# Patient Record
Sex: FEMALE | Race: BLACK OR AFRICAN AMERICAN | Employment: UNEMPLOYED | URBAN - METROPOLITAN AREA
[De-identification: names, ages, dates, MRNs, and addresses within clinical notes are randomized per-mention and may not be internally consistent; named-entity substitution may affect disease eponyms.]

---

## 2022-02-09 ENCOUNTER — HOSPITAL ENCOUNTER (EMERGENCY)
Age: 22
Discharge: HOME | End: 2022-02-09
Attending: EMERGENCY MEDICINE
Payer: COMMERCIAL

## 2022-02-09 ENCOUNTER — APPOINTMENT (OUTPATIENT)
Dept: GENERAL RADIOLOGY | Age: 22
End: 2022-02-09
Attending: STUDENT IN AN ORGANIZED HEALTH CARE EDUCATION/TRAINING PROGRAM
Payer: COMMERCIAL

## 2022-02-09 VITALS
WEIGHT: 293 LBS | TEMPERATURE: 98 F | DIASTOLIC BLOOD PRESSURE: 106 MMHG | RESPIRATION RATE: 20 BRPM | OXYGEN SATURATION: 100 % | BODY MASS INDEX: 43.4 KG/M2 | HEART RATE: 110 BPM | HEIGHT: 69 IN | SYSTOLIC BLOOD PRESSURE: 148 MMHG

## 2022-02-09 DIAGNOSIS — S52.502A CLOSED FRACTURE OF DISTAL END OF LEFT RADIUS, UNSPECIFIED FRACTURE MORPHOLOGY, INITIAL ENCOUNTER: Primary | ICD-10-CM

## 2022-02-09 PROCEDURE — 99283 EMERGENCY DEPT VISIT LOW MDM: CPT

## 2022-02-09 PROCEDURE — 73110 X-RAY EXAM OF WRIST: CPT

## 2022-02-09 PROCEDURE — 74011250637 HC RX REV CODE- 250/637: Performed by: EMERGENCY MEDICINE

## 2022-02-09 RX ORDER — IBUPROFEN 400 MG/1
800 TABLET ORAL ONCE
Status: COMPLETED | OUTPATIENT
Start: 2022-02-09 | End: 2022-02-09

## 2022-02-09 RX ORDER — OXYCODONE AND ACETAMINOPHEN 5; 325 MG/1; MG/1
1 TABLET ORAL
Qty: 6 TABLET | Refills: 0 | Status: SHIPPED | OUTPATIENT
Start: 2022-02-09 | End: 2022-02-12

## 2022-02-09 RX ADMIN — IBUPROFEN 800 MG: 400 TABLET, FILM COATED ORAL at 08:15

## 2022-02-09 NOTE — ED PROVIDER NOTES
EMERGENCY DEPARTMENT HISTORY AND PHYSICAL EXAM    7:11 AM patient seen at this time and results waiting area      Date: 2/9/2022  Patient Name: Ramonita Lee    History of Presenting Illness     Chief Complaint   Patient presents with    Wrist Pain         History Provided By: patient    Additional History (Context): Ramonita Lee is a 19 y.o. female presents with suffered a mechanical fall last night around 10 PM, then increasing left wrist pain. Rates pain as severe no other injuries no syncope pain is constant. No relevant medical history. Pinky Angles PCP: None    Chief Complaint:   Duration:    Timing:    Location:   Quality:   Severity:   Modifying Factors:   Associated Symptoms:           Past History     Past Medical History:  No past medical history on file. Past Surgical History:  No past surgical history on file. Family History:  No family history on file. Social History:  Social History     Tobacco Use    Smoking status: Not on file    Smokeless tobacco: Not on file   Substance Use Topics    Alcohol use: Not on file    Drug use: Not on file       Allergies:  No Known Allergies      Review of Systems     Review of Systems   Constitutional: Negative for diaphoresis and fever. HENT: Negative for congestion and sore throat. Eyes: Negative for pain and itching. Respiratory: Negative for cough and shortness of breath. Cardiovascular: Negative for chest pain and palpitations. Gastrointestinal: Negative for abdominal pain and diarrhea. Endocrine: Negative for polydipsia and polyuria. Genitourinary: Negative for dysuria and hematuria. Musculoskeletal: Positive for arthralgias. Negative for myalgias. Skin: Negative for rash and wound. Neurological: Negative for seizures and syncope. Hematological: Does not bruise/bleed easily. Psychiatric/Behavioral: Negative for agitation and hallucinations.          Physical Exam       Patient Vitals for the past 12 hrs:   Temp Pulse Resp BP SpO2   02/09/22 0529 98 °F (36.7 °C) (!) 110 20 (!) 148/106 98 %       IPVITALS  Patient Vitals for the past 24 hrs:   BP Temp Pulse Resp SpO2 Height Weight   02/09/22 0529 (!) 148/106 98 °F (36.7 °C) (!) 110 20 98 % 5' 9\" (1.753 m) 158.8 kg (350 lb)       Physical Exam  Vitals and nursing note reviewed. Constitutional:       Appearance: She is well-developed. HENT:      Head: Normocephalic and atraumatic. Eyes:      General: No scleral icterus. Conjunctiva/sclera: Conjunctivae normal.   Neck:      Vascular: No JVD. Cardiovascular:      Rate and Rhythm: Normal rate and regular rhythm. Pulmonary:      Effort: Pulmonary effort is normal. No respiratory distress. Musculoskeletal:         General: Swelling, tenderness (A right distal radius. Neurovascular intact no skin defect.) and deformity present. Cervical back: Normal range of motion and neck supple. Skin:     General: Skin is warm and dry. Neurological:      Mental Status: She is alert. Psychiatric:         Thought Content: Thought content normal.         Judgment: Judgment normal.           Diagnostic Study Results   Labs -  No results found for this or any previous visit (from the past 24 hour(s)). Radiologic Studies -   XR WRIST LT AP/LAT/OBL MIN 3V   Final Result   1. Fracture of the dorsal lip of the distal radius. XR WRIST LT AP/LAT/OBL MIN 3V    Result Date: 2/9/2022  EXAM: Left wrist x-ray INDICATION: fall;wrist pain TECHNIQUE: 3 views of the left wrist obtained. COMPARISON:  None. FINDINGS: There is a fracture of the posterior lip of the distal radius which involves a portion of the articular surface. No dislocation identified. There is no evidence of erosions or periostitis. No lytic or blastic focus appreciated. Diffuse soft tissue edema is noted. 1.  Fracture of the dorsal lip of the distal radius.        Medications ordered:   Medications   ibuprofen (MOTRIN) tablet 800 mg (has no administration in time range)         Medical Decision Making   Initial Medical Decision Making and DDx:  I reviewed films of the left wrist.  Consistent with distal radius fracture, impaction. Not suggestive of tendon injury considering the physical exam.  Will place splint and refer to hand surgery. ED Course: Progress Notes, Reevaluation, and Consults:     Splint assessment after staff is placed in a splint, neurovascularly intact and patient is comfortable. I am the first provider for this patient. I reviewed the vital signs, available nursing notes, past medical history, past surgical history, family history and social history. Patient Vitals for the past 12 hrs:   Temp Pulse Resp BP SpO2   02/09/22 0529 98 °F (36.7 °C) (!) 110 20 (!) 148/106 98 %       Vital Signs-Reviewed the patient's vital signs. Pulse Oximetry Analysis, Cardiac Monitor, 12 lead ekg:      Interpreted by the EP. Records Reviewed: Nursing notes reviewed (Time of Review: 7:11 AM)    Procedures:   Critical Care Time:   Aspirin: (was aspirin given for stroke?)    Diagnosis     Clinical Impression:   1. Closed fracture of distal end of left radius, unspecified fracture morphology, initial encounter        Disposition: Discharged      Follow-up Information     Follow up With Specialties Details Why Contact Leslie Sánchez, DO Hand Surgery Call today  [facility]  [phone]             Patient's Medications   Start Taking    OXYCODONE-ACETAMINOPHEN (PERCOCET) 5-325 MG PER TABLET    Take 1 Tablet by mouth every four (4) hours as needed for Pain for up to 3 days. Max Daily Amount: 6 Tablets.    Continue Taking    No medications on file   These Medications have changed    No medications on file   Stop Taking    No medications on file     _______________________________    Notes:    Janes Deleon MD using Dragon dictation      _______________________________

## 2022-02-09 NOTE — Clinical Note
[address] [place] [address] Dr AYANA HIGGINS [facility] EMERGENCY DEPT  [id number] [address] [phone]    Work/School Note    Date: 2/9/2022    To Whom It May concern:    Maria Victoria Mittal was seen and treated today in the emergency room by the following provider(s):  Attending Provider: Morteza Hinton MD.      Maria Victoria Mittal is excused from work/school on 02/09/22 and 02/10/22. She is medically clear to return to work/school on 2/11/2022.        Sincerely,          Olaf Flores MD

## 2022-02-10 ENCOUNTER — OFFICE VISIT (OUTPATIENT)
Dept: ORTHOPEDIC SURGERY | Age: 22
End: 2022-02-10
Payer: COMMERCIAL

## 2022-02-10 VITALS
HEART RATE: 95 BPM | HEIGHT: 69 IN | BODY MASS INDEX: 43.4 KG/M2 | OXYGEN SATURATION: 98 % | TEMPERATURE: 97.3 F | WEIGHT: 293 LBS

## 2022-02-10 DIAGNOSIS — S52.552A OTHER CLOSED EXTRA-ARTICULAR FRACTURE OF DISTAL END OF LEFT RADIUS, INITIAL ENCOUNTER: Primary | ICD-10-CM

## 2022-02-10 PROCEDURE — 99203 OFFICE O/P NEW LOW 30 MIN: CPT | Performed by: ORTHOPAEDIC SURGERY

## 2022-02-10 RX ORDER — DICLOFENAC SODIUM 75 MG/1
75 TABLET, DELAYED RELEASE ORAL 2 TIMES DAILY WITH MEALS
Qty: 20 TABLET | Refills: 0 | Status: SHIPPED | OUTPATIENT
Start: 2022-02-10 | End: 2022-02-20

## 2022-02-10 NOTE — PROGRESS NOTES
Jovani Baxter is a 19 y.o. female right handed unknown employment. Worker's Compensation and legal considerations: none filed. Vitals:    02/10/22 1529   Pulse: 95   Temp: 97.3 °F (36.3 °C)   TempSrc: Temporal   SpO2: 98%   Weight: (!) 361 lb 12.8 oz (164.1 kg)   Height: 5' 9\" (1.753 m)   PainSc:  10 - Worst pain ever   PainLoc: Wrist           Chief Complaint   Patient presents with    Wrist Pain     LT Wrist FX-DOI-2/8/22         HPI: Patient presents today with complaints of left wrist pain after an injury in a fall. Date of onset: 2/8/2022    Injury: Yes: Comment: Fall    Prior Treatment:  Yes: Comment: Splint    Numbness/ Tingling: No      ROS: Review of Systems - General ROS: negative  Psychological ROS: negative  ENT ROS: negative  Allergy and Immunology ROS: negative  Hematological and Lymphatic ROS: negative  Respiratory ROS: no cough, shortness of breath, or wheezing  Cardiovascular ROS: no chest pain or dyspnea on exertion  Gastrointestinal ROS: no abdominal pain, change in bowel habits, or black or bloody stools  Musculoskeletal ROS: negative  Neurological ROS: negative  Dermatological ROS: negative    History reviewed. No pertinent past medical history. Past Surgical History:   Procedure Laterality Date    HX HERNIA REPAIR  2000       Current Outpatient Medications   Medication Sig Dispense Refill    diclofenac EC (VOLTAREN) 75 mg EC tablet Take 1 Tablet by mouth two (2) times daily (with meals) for 10 days. 20 Tablet 0    oxyCODONE-acetaminophen (Percocet) 5-325 mg per tablet Take 1 Tablet by mouth every four (4) hours as needed for Pain for up to 3 days. Max Daily Amount: 6 Tablets. 6 Tablet 0       Allergies   Allergen Reactions    Amoxicillin Nausea and Vomiting    Pcn [Penicillins] Swelling     Gums swell per pt           PE:     Physical Exam  Vitals and nursing note reviewed. Constitutional:       General: She is not in acute distress. Appearance: Normal appearance.  She is not ill-appearing. Cardiovascular:      Pulses: Normal pulses. Pulmonary:      Effort: Pulmonary effort is normal. No respiratory distress. Musculoskeletal:         General: Swelling, tenderness and signs of injury present. No deformity. Cervical back: Normal range of motion. Right lower leg: No edema. Left lower leg: No edema. Skin:     General: Skin is warm and dry. Capillary Refill: Capillary refill takes less than 2 seconds. Findings: No bruising or erythema. Neurological:      General: No focal deficit present. Mental Status: She is alert and oriented to person, place, and time. Psychiatric:         Mood and Affect: Mood normal.         Behavior: Behavior normal.            Left wrist: There is edema and tenderness to palpation dorsally. Neurovascularly intact. Range of motion limited due to swelling stiffness and pain. Imagin/9/2022 external injury films  FINDINGS: There is a fracture of the posterior lip of the distal radius which  involves a portion of the articular surface. No dislocation identified. There is  no evidence of erosions or periostitis. No lytic or blastic focus appreciated. Diffuse soft tissue edema is noted.     IMPRESSION  1. Fracture of the dorsal lip of the distal radius. ICD-10-CM ICD-9-CM    1. Other closed extra-articular fracture of distal end of left radius, initial encounter  S52.552A 813.42 AMB SUPPLY ORDER      diclofenac EC (VOLTAREN) 75 mg EC tablet         Plan:     Left wrist brace to be worn at all times except for showering. Discussed finger range of motion exercises while in the brace. Also discussed ice and elevation. Follow-up and Dispositions    · Return in about 4 weeks (around 3/10/2022) for Reevaluation, x-rays, and possible progression of motion. .          Plan was reviewed with patient, who verbalized agreement and understanding of the plan

## 2023-10-30 ENCOUNTER — OFFICE VISIT (OUTPATIENT)
Age: 23
End: 2023-10-30
Payer: COMMERCIAL

## 2023-10-30 VITALS
RESPIRATION RATE: 16 BRPM | SYSTOLIC BLOOD PRESSURE: 132 MMHG | WEIGHT: 293 LBS | TEMPERATURE: 97.5 F | HEIGHT: 67 IN | HEART RATE: 96 BPM | OXYGEN SATURATION: 98 % | BODY MASS INDEX: 45.99 KG/M2 | DIASTOLIC BLOOD PRESSURE: 84 MMHG

## 2023-10-30 DIAGNOSIS — K21.9 GASTROESOPHAGEAL REFLUX DISEASE, UNSPECIFIED WHETHER ESOPHAGITIS PRESENT: ICD-10-CM

## 2023-10-30 DIAGNOSIS — E66.01 MORBID OBESITY DUE TO EXCESS CALORIES (HCC): ICD-10-CM

## 2023-10-30 DIAGNOSIS — Z01.818 PREOP TESTING: Primary | ICD-10-CM

## 2023-10-30 DIAGNOSIS — Z71.89 ENCOUNTER FOR PRE-BARIATRIC SURGERY COUNSELING AND EDUCATION: Primary | ICD-10-CM

## 2023-10-30 PROCEDURE — 99204 OFFICE O/P NEW MOD 45 MIN: CPT | Performed by: SURGERY

## 2023-10-30 NOTE — PROGRESS NOTES
whether esophagitis present      Plan:   The patient presents today with severe obesity and obesity related risks/comorbidities as detailed above. The patient has made multiple unsuccessful attempts at weight loss as detailed above. The patient desires surgical weight loss for a better chance of lifelong weight control and control of co-morbidities. They have attended the bariatric seminar and meet the qualifications for surgery based on the NIH criteria. We have discussed the various surgical options including the sleeve gastrectomy, gastric bypass, duodenal switch/modified duodenal switch. We also discussed non operative alternatives. The patient is currently interested in the sleeve gastrectomy. I believe they are a good candidate for this procedure. They will need to a/an UGI, EGD to evaluate their anatomy pre operatively. H pylori antigen/breath test ordered as well. We have discussed the possible complications of bariatric surgery which include but are not limited to failed weight loss, weight regain, malnutrition, leak, bleed, stricture, gastric ulcer, gastric fistula, gastric bleed, gallstones, new or worsening gastric reflux, nausea, emesis, internal hernia, abdominal wall hernia, gastric perforation, need for revision / conversion / or reversal, pregnancy complications and loss, intestinal ischemia, post operative skin complications, possible thinning of their hair, bowel obstruction, dumping syndrome, wound infection, blood clots (DVT, mesenteric thrombus, pulmonary embolism), increased addictive tendency, risk of anesthesia, and death. The patient understands this is a life altering decision and will require compliance to the program for the remainder of their life in order to be monitored to avoid complication and ensure successful, sustained weight control.  They will be placed on a lifelong low carbohydrate and low sugar diet, exercise, and vitamin regimen and will require frequent blood

## 2023-10-31 ENCOUNTER — HOSPITAL ENCOUNTER (OUTPATIENT)
Facility: HOSPITAL | Age: 23
Setting detail: SPECIMEN
Discharge: HOME | End: 2023-11-03
Payer: COMMERCIAL

## 2023-10-31 DIAGNOSIS — Z01.818 PREOP TESTING: ICD-10-CM

## 2023-10-31 PROCEDURE — 83013 H PYLORI (C-13) BREATH: CPT

## 2023-11-01 LAB — UREA BREATH TEST QL: NEGATIVE

## 2023-11-05 ENCOUNTER — TELEPHONE (OUTPATIENT)
Age: 23
End: 2023-11-05
Payer: COMMERCIAL

## 2023-11-05 DIAGNOSIS — Z71.89 ENCOUNTER FOR PRE-BARIATRIC SURGERY COUNSELING AND EDUCATION: Primary | ICD-10-CM

## 2023-11-05 DIAGNOSIS — Z01.818 PRE-OP TESTING: ICD-10-CM

## 2023-11-05 DIAGNOSIS — K21.9 GASTROESOPHAGEAL REFLUX DISEASE, UNSPECIFIED WHETHER ESOPHAGITIS PRESENT: ICD-10-CM

## 2023-11-05 DIAGNOSIS — E66.01 MORBID OBESITY (HCC): ICD-10-CM

## 2023-11-05 PROCEDURE — 43235 EGD DIAGNOSTIC BRUSH WASH: CPT | Performed by: REGISTERED NURSE

## 2023-11-07 ENCOUNTER — HOSPITAL ENCOUNTER (OUTPATIENT)
Facility: HOSPITAL | Age: 23
Discharge: HOME | End: 2023-11-10

## 2023-11-07 ENCOUNTER — CLINICAL DOCUMENTATION (OUTPATIENT)
Facility: HOSPITAL | Age: 23
End: 2023-11-07

## 2023-11-07 ENCOUNTER — TELEPHONE (OUTPATIENT)
Age: 23
End: 2023-11-07

## 2023-11-07 NOTE — PROGRESS NOTES
bands. Patient was encouraged not to be afraid to excuse themselves from the table to go for a walk after they eat. Behavior Modification    Reinforced behavior changes to make. Patient was encouraged to keep their emotions in check. Try to HALT and focus on whether they are eating out of hunger or if they are eating out of emotions. Other eating behaviors included surveying the buffet line before starting to fill up their plate. Patient was given a check off list and encouraged to monitor some of their eating behaviors, such as eating slowly, chewing their food thoroughly, and taking 20-30 minutes to eat a meal.        Goal that patient set for next month include:  Be consistent. Start walking  3.   Cut out liquid calories    Lionel Gong, NICKI    11/7/2023

## 2023-11-07 NOTE — TELEPHONE ENCOUNTER
Today is the second attempt to register the patient with her insurance through [employer].    The patient will need to be enrolled with her health insurance for 12 months to complete her bariatric requirement.     I tried on Friday 11/3/2023, twice; and the phone call was twice dropped.     Today, 11/7/2023 I spoke to Ro and she wasn't able to put my call through the bariatric department.  She sent a message to have them call the office.     SUPRIYA returned my call and the patient was set to start her sessions.    I called the patient and I provided her with all the information needed for the enrollment.   She must enroll with 30 days, from starting day 11/3/2023.    The patient verbalized understanding.     Chen

## 2023-12-05 ENCOUNTER — CLINICAL DOCUMENTATION (OUTPATIENT)
Facility: HOSPITAL | Age: 23
End: 2023-12-05

## 2023-12-05 NOTE — PROGRESS NOTES
12/5/23:  Patient reached back out and stated she does not have the benefit anymore and will not be able to pay out of pocket for the surgery. Her upcoming visits were canceled.     Amado Cr MS RD

## 2023-12-05 NOTE — PROGRESS NOTES
12/5/23:  Patient did not show for her 2nd in person nutrition visit, which was in person. She was contacted and left a voicemail and e-mail regarding rescheduling.     Garry Palencia MS RD

## 2023-12-06 ENCOUNTER — TELEPHONE (OUTPATIENT)
Age: 23
End: 2023-12-06

## 2023-12-06 NOTE — TELEPHONE ENCOUNTER
The patient has [employer] PPO with 12 months requirements to complete bariatric program through [employer].   The patient will be finishing the bariatric program in October, 2024   The patient will turn 24 in July 2024, therefore she will be disqualified in July, and will not be able to continue.    She had requested that all her appts for bariatric to be cancelled.     LENIN, please cancel her follow up appts.     Thank you,     Chen